# Patient Record
Sex: MALE | Race: WHITE | NOT HISPANIC OR LATINO | URBAN - METROPOLITAN AREA
[De-identification: names, ages, dates, MRNs, and addresses within clinical notes are randomized per-mention and may not be internally consistent; named-entity substitution may affect disease eponyms.]

---

## 2022-06-04 ENCOUNTER — TELEPHONE ENCOUNTER (OUTPATIENT)
Dept: URBAN - METROPOLITAN AREA CLINIC 68 | Facility: CLINIC | Age: 82
End: 2022-06-04

## 2022-06-04 RX ORDER — ACYCLOVIR 800 MG/1
ACYCLOVIR( 800MG ORAL 1 THREE TIMES DAILY ) UNDEFINED -HX ENTRY TABLET ORAL
OUTPATIENT
Start: 2017-02-17

## 2022-06-04 RX ORDER — SODIUM PHOSPHATE, MONOBASIC, MONOHYDRATE, SODIUM PHOSPHATE, DIBASIC ANHYDROUS 1.102; .398 G/1; G/1
TABLET ORAL AS DIRECTED
Qty: 1 | Refills: 0 | OUTPATIENT
Start: 2012-10-31 | End: 2012-11-01

## 2022-06-04 RX ORDER — ASPIRIN 81 MG/1
ASPIRIN( 81MG ORAL 1 EVERY TWENTY-FOUR HOURS ) UNDEFINED -HX ENTRY TABLET, COATED ORAL
OUTPATIENT
Start: 2012-01-19

## 2022-06-04 RX ORDER — SODIUM PICOSULFATE, MAGNESIUM OXIDE, AND ANHYDROUS CITRIC ACID 10; 3.5; 12 MG/16.2G; G/16.2G; G/16.2G
POWDER, METERED ORAL AS DIRECTED
Qty: 1 | Refills: 0 | OUTPATIENT
Start: 2017-11-15 | End: 2017-11-16

## 2022-06-04 RX ORDER — ERYTHROMYCIN 5 MG/G
ERYTHROMYCIN( 5MG/GM OPHTHALMIC   ) UNDEFINED -HX ENTRY OINTMENT OPHTHALMIC
OUTPATIENT
Start: 2017-02-17

## 2022-06-05 ENCOUNTER — TELEPHONE ENCOUNTER (OUTPATIENT)
Dept: URBAN - METROPOLITAN AREA CLINIC 68 | Facility: CLINIC | Age: 82
End: 2022-06-05

## 2022-06-05 RX ORDER — GLUCOSAMINE HCL 500 MG
VITAMIN D3( 3000UNIT ORAL  FOUR TIMES A WEEK ) ACTIVE -HX ENTRY TABLET ORAL
Status: ACTIVE | COMMUNITY
Start: 2017-11-15

## 2022-06-05 RX ORDER — CYANOCOBALAMIN (VITAMIN B-12) 500 MCG
VITAMIN B-12( 500MCG ORAL  TWICE A WEEK ) ACTIVE -HX ENTRY TABLET ORAL
Status: ACTIVE | COMMUNITY
Start: 2017-11-15

## 2022-06-05 RX ORDER — VIT A/VIT C/VIT E/ZINC/COPPER 2148-113
PRESERVISION AREDS(  ORAL  TWICE A DAY ) ACTIVE -HX ENTRY TABLET ORAL TWICE A DAY
Status: ACTIVE | COMMUNITY
Start: 2017-11-15

## 2022-06-05 RX ORDER — ATENOLOL 100 MG/1
ATENOLOL( 100MG ORAL  DAILY ) ACTIVE -HX ENTRY TABLET ORAL DAILY
Status: ACTIVE | COMMUNITY
Start: 2017-11-15

## 2022-06-05 RX ORDER — LISINOPRIL 20 MG/1
LISINOPRIL( 20MG ORAL  DAILY ) ACTIVE -HX ENTRY TABLET ORAL DAILY
Status: ACTIVE | COMMUNITY
Start: 2017-11-15

## 2022-06-25 ENCOUNTER — TELEPHONE ENCOUNTER (OUTPATIENT)
Age: 82
End: 2022-06-25

## 2022-06-25 RX ORDER — SODIUM PHOSPHATE, MONOBASIC, MONOHYDRATE, SODIUM PHOSPHATE, DIBASIC ANHYDROUS 1.102; .398 G/1; G/1
TABLET ORAL AS DIRECTED
Qty: 1 | Refills: 0 | OUTPATIENT
Start: 2012-10-31 | End: 2012-11-01

## 2022-06-25 RX ORDER — ASPIRIN 81 MG/1
ASPIRIN( 81MG ORAL 1 EVERY TWENTY-FOUR HOURS ) UNDEFINED -HX ENTRY TABLET, COATED ORAL
OUTPATIENT
Start: 2012-01-19

## 2022-06-25 RX ORDER — ERYTHROMYCIN 5 MG/G
ERYTHROMYCIN( 5MG/GM OPHTHALMIC   ) UNDEFINED -HX ENTRY OINTMENT OPHTHALMIC
OUTPATIENT
Start: 2017-02-17

## 2022-06-25 RX ORDER — ACYCLOVIR 800 MG/1
ACYCLOVIR( 800MG ORAL 1 THREE TIMES DAILY ) UNDEFINED -HX ENTRY TABLET ORAL
OUTPATIENT
Start: 2017-02-17

## 2022-06-26 ENCOUNTER — TELEPHONE ENCOUNTER (OUTPATIENT)
Age: 82
End: 2022-06-26

## 2022-06-26 RX ORDER — VIT A/VIT C/VIT E/ZINC/COPPER 2148-113
PRESERVISION AREDS(  ORAL  TWICE A DAY ) ACTIVE -HX ENTRY TABLET ORAL TWICE A DAY
Status: ACTIVE | COMMUNITY
Start: 2017-11-15

## 2022-06-26 RX ORDER — GLUCOSAMINE HCL 500 MG
VITAMIN D3( 3000UNIT ORAL  FOUR TIMES A WEEK ) ACTIVE -HX ENTRY TABLET ORAL
Status: ACTIVE | COMMUNITY
Start: 2017-11-15

## 2022-06-26 RX ORDER — ATENOLOL 100 MG/1
ATENOLOL( 100MG ORAL  DAILY ) ACTIVE -HX ENTRY TABLET ORAL DAILY
Status: ACTIVE | COMMUNITY
Start: 2017-11-15

## 2022-06-26 RX ORDER — LISINOPRIL 20 MG/1
LISINOPRIL( 20MG ORAL  DAILY ) ACTIVE -HX ENTRY TABLET ORAL DAILY
Status: ACTIVE | COMMUNITY
Start: 2017-11-15

## 2022-06-26 RX ORDER — ASPIRIN 81 MG/1
TABLET, COATED ORAL DAILY
Status: ACTIVE | COMMUNITY
Start: 2017-11-15

## 2022-11-08 ENCOUNTER — DASHBOARD ENCOUNTERS (OUTPATIENT)
Age: 82
End: 2022-11-08

## 2022-11-08 ENCOUNTER — OFFICE VISIT (OUTPATIENT)
Dept: URBAN - METROPOLITAN AREA CLINIC 68 | Facility: CLINIC | Age: 82
End: 2022-11-08

## 2022-11-08 RX ORDER — VIT A/VIT C/VIT E/ZINC/COPPER 2148-113
PRESERVISION AREDS(  ORAL  TWICE A DAY ) ACTIVE -HX ENTRY TABLET ORAL TWICE A DAY
Status: ACTIVE | COMMUNITY
Start: 2017-11-15

## 2022-11-08 RX ORDER — CYANOCOBALAMIN (VITAMIN B-12) 500 MCG
VITAMIN B-12( 500MCG ORAL  TWICE A WEEK ) ACTIVE -HX ENTRY TABLET ORAL
Status: ACTIVE | COMMUNITY
Start: 2017-11-15

## 2022-11-08 RX ORDER — ATENOLOL 100 MG/1
ATENOLOL( 100MG ORAL  DAILY ) ACTIVE -HX ENTRY TABLET ORAL DAILY
Status: ACTIVE | COMMUNITY
Start: 2017-11-15

## 2022-11-08 RX ORDER — LISINOPRIL 20 MG/1
LISINOPRIL( 20MG ORAL  DAILY ) ACTIVE -HX ENTRY TABLET ORAL DAILY
Status: ACTIVE | COMMUNITY
Start: 2017-11-15

## 2022-11-08 RX ORDER — GLUCOSAMINE HCL 500 MG
VITAMIN D3( 3000UNIT ORAL  FOUR TIMES A WEEK ) ACTIVE -HX ENTRY TABLET ORAL
Status: ACTIVE | COMMUNITY
Start: 2017-11-15

## 2022-11-08 RX ORDER — SOD SULF/POT CHLORIDE/MAG SULF 1.479 G
24 TABLETS AS DIRECTED TABLET ORAL ONCE
Qty: 24 TABLET | Refills: 0 | OUTPATIENT

## 2022-11-08 NOTE — HPI-MIGRATED HPI
General : Patient has a history of colon polyps in the past, Patient denies any active symptoms of rectal bleeding or significant change in BM pattern, and no other alarm symptoms  Pt denies abdominal pain or Upper GI symptoms

## 2022-11-08 NOTE — HPI-MIGRATED HPI
General : Last colon 2017 11/30/17 conetr Hisory of severe impaction in the past, now daily BM dependent on one prune daily  Drinks some needs to increase fluid intake  NO cardiac or pulmonary symptoms Meantions benign PVCs since  1980

## 2022-12-13 ENCOUNTER — OFFICE VISIT (OUTPATIENT)
Dept: URBAN - METROPOLITAN AREA SURGERY CENTER 12 | Facility: SURGERY CENTER | Age: 82
End: 2022-12-13

## 2022-12-28 ENCOUNTER — TELEPHONE ENCOUNTER (OUTPATIENT)
Dept: URBAN - METROPOLITAN AREA CLINIC 68 | Facility: CLINIC | Age: 82
End: 2022-12-28

## 2023-01-04 ENCOUNTER — TELEPHONE ENCOUNTER (OUTPATIENT)
Dept: URBAN - METROPOLITAN AREA CLINIC 68 | Facility: CLINIC | Age: 83
End: 2023-01-04

## 2023-01-27 ENCOUNTER — TELEPHONE ENCOUNTER (OUTPATIENT)
Dept: URBAN - METROPOLITAN AREA CLINIC 68 | Facility: CLINIC | Age: 83
End: 2023-01-27